# Patient Record
Sex: FEMALE | Race: WHITE | NOT HISPANIC OR LATINO | URBAN - METROPOLITAN AREA
[De-identification: names, ages, dates, MRNs, and addresses within clinical notes are randomized per-mention and may not be internally consistent; named-entity substitution may affect disease eponyms.]

---

## 2020-04-16 ENCOUNTER — IMPORTED ENCOUNTER (OUTPATIENT)
Dept: URBAN - METROPOLITAN AREA CLINIC 50 | Facility: CLINIC | Age: 55
End: 2020-04-16

## 2021-04-17 ASSESSMENT — VISUAL ACUITY
OD_CC: J1+
OS_BAT: 20/25
OS_CC: J1+
OD_CC: 20/20
OD_BAT: 20/25
OS_OTHER: 20/25.
OS_CC: 20/20
OD_OTHER: 20/25.

## 2021-04-17 ASSESSMENT — TONOMETRY
OD_IOP_MMHG: 12
OS_IOP_MMHG: 12

## 2022-02-08 ENCOUNTER — PREPPED CHART (OUTPATIENT)
Dept: URBAN - METROPOLITAN AREA CLINIC 53 | Facility: CLINIC | Age: 57
End: 2022-02-08

## 2022-02-14 ENCOUNTER — COMPREHENSIVE EXAM (OUTPATIENT)
Dept: URBAN - METROPOLITAN AREA CLINIC 53 | Facility: CLINIC | Age: 57
End: 2022-02-14

## 2022-02-14 DIAGNOSIS — Z01.01: ICD-10-CM

## 2022-02-14 DIAGNOSIS — H52.03: ICD-10-CM

## 2022-02-14 DIAGNOSIS — H40.013: ICD-10-CM

## 2022-02-14 PROCEDURE — 92133 CPTRZD OPH DX IMG PST SGM ON: CPT

## 2022-02-14 PROCEDURE — 92014 COMPRE OPH EXAM EST PT 1/>: CPT

## 2022-02-14 PROCEDURE — 92015 DETERMINE REFRACTIVE STATE: CPT

## 2022-02-14 ASSESSMENT — VISUAL ACUITY
OS_CC: 20/30-1
OS_GLARE: 20/40
OS_PH: 20/25+2
OS_GLARE: 20/50-1
OD_CC: 20/25+2
OD_GLARE: 20/30
OD_GLARE: 20/30
OU_CC: J1+

## 2022-02-14 ASSESSMENT — KERATOMETRY
OD_AXISANGLE2_DEGREES: 70
OS_K1POWER_DIOPTERS: 41.50
OS_AXISANGLE_DEGREES: 178
OD_K2POWER_DIOPTERS: 42.00
OD_AXISANGLE_DEGREES: 160
OS_K2POWER_DIOPTERS: 42.00
OD_K1POWER_DIOPTERS: 41.75
OS_AXISANGLE2_DEGREES: 88

## 2022-02-14 ASSESSMENT — TONOMETRY
OD_IOP_MMHG: 12
OS_IOP_MMHG: 12

## 2022-02-14 NOTE — PATIENT DISCUSSION
Per moderate c/d ratio OS>OD. IOP wnl, 12/12. (+) family hx, Sister. OCT (RNFL) done today, wnl. Will monitor.

## 2024-02-16 ENCOUNTER — COMPREHENSIVE EXAM (OUTPATIENT)
Dept: URBAN - METROPOLITAN AREA CLINIC 53 | Facility: CLINIC | Age: 59
End: 2024-02-16

## 2024-02-16 DIAGNOSIS — H52.03: ICD-10-CM

## 2024-02-16 DIAGNOSIS — Z01.01: ICD-10-CM

## 2024-02-16 PROCEDURE — 92014 COMPRE OPH EXAM EST PT 1/>: CPT

## 2024-02-16 PROCEDURE — 92015 DETERMINE REFRACTIVE STATE: CPT

## 2024-02-16 ASSESSMENT — KERATOMETRY
OS_K1POWER_DIOPTERS: 41.50
OS_AXISANGLE_DEGREES: 158
OS_K2POWER_DIOPTERS: 41.75
OD_K2POWER_DIOPTERS: 42.25
OD_AXISANGLE2_DEGREES: 90
OD_K1POWER_DIOPTERS: 42.00
OS_AXISANGLE2_DEGREES: 68
OD_AXISANGLE_DEGREES: 180

## 2024-02-16 ASSESSMENT — VISUAL ACUITY
OS_CC: 20/20-1
OD_GLARE: 20/20
OD_GLARE: 20/20
OU_CC: J1+@16"
OS_GLARE: 20/20
OU_CC: 20/20
OS_GLARE: 20/20
OD_CC: 20/20

## 2024-02-16 ASSESSMENT — TONOMETRY
OD_IOP_MMHG: 15
OS_IOP_MMHG: 15